# Patient Record
Sex: FEMALE
[De-identification: names, ages, dates, MRNs, and addresses within clinical notes are randomized per-mention and may not be internally consistent; named-entity substitution may affect disease eponyms.]

---

## 2022-06-15 ENCOUNTER — RESULT REVIEW (OUTPATIENT)
Age: 27
End: 2022-06-15

## 2022-08-30 PROBLEM — Z00.00 ENCOUNTER FOR PREVENTIVE HEALTH EXAMINATION: Status: ACTIVE | Noted: 2022-08-30

## 2022-08-30 NOTE — DATA REVIEWED
[FreeTextEntry1] : Date of Exam: 06-\par  \par EXAM: DIGITAL BILATERAL DIAGNOSTIC MAMMOGRAM AND TOMOSYNTHESIS AND BREAST ULTRASOUND\par \par HISTORY: The patient is seen for inferior left breast hardness and tenderness x1 month.. Recently stopped nursing. There is no personal history of breast cancer. Latest Covid vaccination right arm December 2021. Family history of breast cancer: None.\par Age: 27 years old. \par \par CLINICAL BREAST EXAMINATION: The patient reports that her last clinical breast exam was within the past year. \par \par TECHNIQUE: Full-field digital mammography of bilateral breasts was obtained. Additional imaging is composed of MLO and craniocaudad views of both breasts were obtained. Low-dose full-field digital breast tomosynthesis examination was performed with tomosynthesis acquisitions and synthesized 2D reconstructed mammogram. Computer-aided detection (CAD) was utilized.  \par \par COMPARISON: Baseline examination.\par \par FINDINGS:\par BREAST COMPOSITION: The breasts are extremely dense, which lowers the sensitivity of mammography.\par \par No suspicious masses, architectural distortion, or significant calcifications are detected in the right breast. \par \par Left: 7:00 middle one third depth, 4 cm mass. \par \par \par BREAST ULTRASOUND:  \par \par TECHNIQUE: Complete bilateral breast ultrasound, with evaluation of the four quadrants, retroareolar regions and axillae, was performed. No prior ultrasound. \par \par FINDINGS: \par Heterogeneous echotexture diffuse dense glandular parenchyma. Benign-appearing right axillary nodes. No suspicious cyst, mass or areas of abnormal shadowing in the right breast.\par \par Left: 5:00 N 0 cm, palpable 4.0 x 3.3 x 1.7 cm, irregular thick-walled heterogeneous complex cystic lesion, possibly a galactocele. 2.5 cm lymph node with focally thickened cortex at 6 mm.\par \par IMPRESSION: \par 1. Left 5:00, indeterminate 4 cm complex cystic lesion, possibly galactocele, but still warrants ultrasound-guided biopsy.\par 2. Prominent left axillary lymph node with thickened cortex, possibly inflammatory in nature. \par \par FOLLOW-UP: Ultrasound guided biopsy. \par \par 1. Ultrasound-guided left breast biopsy x1.\par 2. 3 month follow-up targeted left axillary ultrasound.\par \par ASSESSMENT: BI-RADS Category 4:  Suspicious. \par \par \par \par 06/15/22\par Original Report\par EXAM: ULTRASOUND-GUIDED CORE BIOPSY 1 SITE\par \par HISTORY: The patient is 27 years old and is referred for an ultrasound-guided needle biopsy:\par Left 5:00 N0 centimeter, palpable 4 cm complex cystic lesion.\par \par COMPARISON:  Prior breast imaging studies dated June 2022 \par \par PROCEDURE: The risks and benefits of the procedure were conveyed to the patient, using a  if necessary, and the patient consented to the procedure. Universal timeout was performed. Targeted ultrasound performed prior to the procedure reconfirms the suspicious lesion. \par \par Using sterile technique, 7 mL of 1% lidocaine was administered. Under sonographic visualization and utilizing an introducer, a 14-gauge [employer] spring-loaded core biopsy device was used to sample the target. Multiple samples were obtained. A T shaped biopsy marker was deployed at the biopsy site. A sonographically visualized residual lesion was present after sampling. Approximately 15 cc turbid pinkish-tan fluid was drained.\par \par The patient tolerated the procedure well without complications. The patient was given postbiopsy care instructions. The specimen was subsequently sent to the pathology lab. \par \par IMPRESSION: 1 site ultrasound-guided core biopsy was performed. \par \par Addendum 1 - 06-\par  \par Addendum:\par \par Pathology results from [facility]:\par \par Left 5:00, sono-guided biopsy: Breast tissue with abscess showing abundant fibropurulent and necroinflammatory debris.\par \par The pathology results are concordant with the imaging. \par \par Follow-up recommendations:\par 1. Clinical follow-up.\par 2. Six-month follow-up left breast targeted ultrasound.

## 2022-09-01 ENCOUNTER — APPOINTMENT (OUTPATIENT)
Dept: SURGERY | Facility: CLINIC | Age: 27
End: 2022-09-01